# Patient Record
Sex: FEMALE | Race: WHITE | Employment: OTHER | URBAN - METROPOLITAN AREA
[De-identification: names, ages, dates, MRNs, and addresses within clinical notes are randomized per-mention and may not be internally consistent; named-entity substitution may affect disease eponyms.]

---

## 2017-01-01 ENCOUNTER — TELEPHONE (OUTPATIENT)
Dept: CARDIOLOGY CLINIC | Age: 82
End: 2017-01-01

## 2017-01-01 ENCOUNTER — DOCUMENTATION ONLY (OUTPATIENT)
Dept: CARDIOLOGY CLINIC | Age: 82
End: 2017-01-01

## 2017-01-01 DIAGNOSIS — I11.0 BENIGN HYPERTENSIVE HEART DISEASE WITH HEART FAILURE (HCC): ICD-10-CM

## 2017-01-01 DIAGNOSIS — I48.0 PAROXYSMAL ATRIAL FIBRILLATION (HCC): ICD-10-CM

## 2017-01-01 DIAGNOSIS — D69.6 THROMBOCYTOPENIA (HCC): ICD-10-CM

## 2017-01-01 DIAGNOSIS — G20 PARKINSON'S DISEASE (HCC): ICD-10-CM

## 2017-01-01 DIAGNOSIS — Z95.810 BIVENTRICULAR IMPLANTABLE CARDIOVERTER-DEFIBRILLATOR IN SITU: ICD-10-CM

## 2017-01-01 DIAGNOSIS — D64.9 ANEMIA, UNSPECIFIED TYPE: ICD-10-CM

## 2017-01-01 DIAGNOSIS — I42.9 CARDIOMYOPATHY (HCC): ICD-10-CM

## 2017-01-01 DIAGNOSIS — I10 ESSENTIAL HYPERTENSION WITH GOAL BLOOD PRESSURE LESS THAN 130/80: ICD-10-CM

## 2017-01-01 DIAGNOSIS — I50.22 CHRONIC SYSTOLIC HEART FAILURE (HCC): ICD-10-CM

## 2017-01-01 DIAGNOSIS — N18.30 CKD (CHRONIC KIDNEY DISEASE) STAGE 3, GFR 30-59 ML/MIN (HCC): ICD-10-CM

## 2017-01-01 DIAGNOSIS — G47.33 OSA (OBSTRUCTIVE SLEEP APNEA): ICD-10-CM

## 2017-01-01 RX ORDER — CARVEDILOL 12.5 MG/1
18.75 TABLET ORAL 2 TIMES DAILY WITH MEALS
Qty: 180 TAB | Refills: 3
Start: 2017-01-01

## 2017-01-22 NOTE — TELEPHONE ENCOUNTER
Spoke to [place] M[place], her daughter. HR 120s today, has been in the 60s. Bit more winded earlier today, better now. Digoxin stopped Dec due to level of 2.6 at [facility] ED - daughter gave her one dose today. I advised no more.     Increase carvedilol to 18.75 mg bid    Will get Merlin transmission reviewed by EP team tomorrow - suspect AF    Her functional status deteriorating

## 2017-01-23 NOTE — TELEPHONE ENCOUNTER
Spoke to [employer] & having issue transmitting. She stated they have FIOS. She will come by [facility] in am to  cell adapter & try at that time.   She stated pt is feeling better now & her HR is back to normal.

## 2017-01-23 NOTE — TELEPHONE ENCOUNTER
[facility] [employer] & having her send a manual transmission since no alert has shown up. Verify the new [employer] is working. She will explain to [place] how to send manually then call me back in about an hour. Neel Valente is not with pt right now.

## 2017-01-24 NOTE — TELEPHONE ENCOUNTER
Gave daughter cell adapter this am.  She is still having issues with it working. Beverley haq/YOVANI calling [employer] to trouble shoot. I will call her tomorrow to verify if it came.

## 2017-01-24 NOTE — TELEPHONE ENCOUNTER
Pt called to verify if you have received her transmission. Please call her on her at [phone]. Thanks.

## 2017-01-25 NOTE — TELEPHONE ENCOUNTER
Spoke to [facility] w/[facility]FERNAND[facility] & he stated that there was a problem with pts remote being paired to the wrong box. He worked on getting it fixed yest & today. Entered a transmission date to do tomorrow. Will call Radha Chin tomorrow to verify if it worked.

## 2017-01-26 NOTE — TELEPHONE ENCOUNTER
Merlin worked today. No alerts. Informed daughter & confirmed her appt for device check & Dr Chris Powers in March & will give her a new danny at that time.

## 2017-02-01 NOTE — TELEPHONE ENCOUNTER
Spoke to pts daughter Derrick Rizvi. Pt DID NOT PASS AWAY. She has gone down hill quickly due to her Parkinson's Disease. She has stopped eating & drinking. She is a DNR & family would like her ICD therapies turned off. Will send to Dr Zina Hazel to put in order. Will call [facility] rep to go to pts home today. Verified ph & address.

## 2017-02-01 NOTE — TELEPHONE ENCOUNTER
Pt daughter called and stated pt passed away and they are requesting the next step to have pt's Merlin turned off. Please return her call to [phone]. Thanks.